# Patient Record
Sex: FEMALE | Race: WHITE | URBAN - METROPOLITAN AREA
[De-identification: names, ages, dates, MRNs, and addresses within clinical notes are randomized per-mention and may not be internally consistent; named-entity substitution may affect disease eponyms.]

---

## 2023-11-02 LAB
ABO, EXTERNAL RESULT: NORMAL
HEP B, EXTERNAL RESULT: NEGATIVE
HIV, EXTERNAL RESULT: NON REACTIVE
RH FACTOR, EXTERNAL RESULT: POSITIVE
RUBELLA TITER, EXTERNAL RESULT: NORMAL
T. PALLIDUM (SYPHILIS) ANTIBODY, EXTERNAL RESULT: NON REACTIVE

## 2023-11-16 LAB
C. TRACHOMATIS, EXTERNAL RESULT: NEGATIVE
N. GONORRHOEAE, EXTERNAL RESULT: NEGATIVE

## 2024-04-16 LAB — GBS, EXTERNAL RESULT: POSITIVE

## 2024-04-26 ENCOUNTER — HOSPITAL ENCOUNTER (OUTPATIENT)
Dept: INFUSION THERAPY | Age: 29
Discharge: HOME | End: 2024-04-26
Payer: COMMERCIAL

## 2024-04-26 ENCOUNTER — INITIAL CONSULT (OUTPATIENT)
Dept: ONCOLOGY | Age: 29
End: 2024-04-26
Payer: COMMERCIAL

## 2024-04-26 VITALS
RESPIRATION RATE: 16 BRPM | TEMPERATURE: 98 F | OXYGEN SATURATION: 98 % | HEART RATE: 68 BPM | SYSTOLIC BLOOD PRESSURE: 127 MMHG | DIASTOLIC BLOOD PRESSURE: 60 MMHG | WEIGHT: 165.8 LBS

## 2024-04-26 DIAGNOSIS — D69.6 THROMBOCYTOPENIA (HCC): ICD-10-CM

## 2024-04-26 DIAGNOSIS — D64.9 ANEMIA, UNSPECIFIED TYPE: ICD-10-CM

## 2024-04-26 DIAGNOSIS — D69.6 THROMBOCYTOPENIA (HCC): Primary | ICD-10-CM

## 2024-04-26 LAB
BASOPHILS ABSOLUTE: 0 K/CU MM
BASOPHILS RELATIVE PERCENT: 0.1 % (ref 0–1)
DIFFERENTIAL TYPE: ABNORMAL
EOSINOPHILS ABSOLUTE: 0 K/CU MM
EOSINOPHILS RELATIVE PERCENT: 0.4 % (ref 0–3)
HCT VFR BLD CALC: 34.7 % (ref 37–47)
HEMOGLOBIN: 11.4 GM/DL (ref 12.5–16)
LYMPHOCYTES ABSOLUTE: 1.9 K/CU MM
LYMPHOCYTES RELATIVE PERCENT: 21 % (ref 24–44)
MCH RBC QN AUTO: 27 PG (ref 27–31)
MCHC RBC AUTO-ENTMCNC: 32.9 % (ref 32–36)
MCV RBC AUTO: 82 FL (ref 78–100)
MONOCYTES ABSOLUTE: 0.8 K/CU MM
MONOCYTES RELATIVE PERCENT: 8.4 % (ref 0–4)
NEUTROPHILS RELATIVE PERCENT: 70.1 % (ref 36–66)
PDW BLD-RTO: 13.8 % (ref 11.7–14.9)
PLATELET # BLD: 104 K/CU MM (ref 140–440)
PMV BLD AUTO: 12.6 FL (ref 7.5–11.1)
RBC # BLD: 4.23 M/CU MM (ref 4.2–5.4)
SEGMENTED NEUTROPHILS ABSOLUTE COUNT: 6.3 K/CU MM
WBC # BLD: 9 K/CU MM (ref 4–10.5)

## 2024-04-26 PROCEDURE — 85025 COMPLETE CBC W/AUTO DIFF WBC: CPT

## 2024-04-26 PROCEDURE — 36415 COLL VENOUS BLD VENIPUNCTURE: CPT

## 2024-04-26 PROCEDURE — 99202 OFFICE O/P NEW SF 15 MIN: CPT

## 2024-04-26 PROCEDURE — 99204 OFFICE O/P NEW MOD 45 MIN: CPT | Performed by: INTERNAL MEDICINE

## 2024-04-26 RX ORDER — FERROUS SULFATE 325(65) MG
1 TABLET ORAL EVERY OTHER DAY
COMMUNITY
Start: 2024-03-24

## 2024-04-26 RX ORDER — ASPIRIN 81 MG/1
81 TABLET, COATED ORAL DAILY
COMMUNITY
Start: 2024-04-15 | End: 2024-04-26

## 2024-04-26 ASSESSMENT — PATIENT HEALTH QUESTIONNAIRE - PHQ9
SUM OF ALL RESPONSES TO PHQ9 QUESTIONS 1 & 2: 0
1. LITTLE INTEREST OR PLEASURE IN DOING THINGS: NOT AT ALL
SUM OF ALL RESPONSES TO PHQ QUESTIONS 1-9: 0
2. FEELING DOWN, DEPRESSED OR HOPELESS: NOT AT ALL
SUM OF ALL RESPONSES TO PHQ QUESTIONS 1-9: 0

## 2024-04-26 NOTE — PROGRESS NOTES
MA Rooming Questions  Patient: Maddie Vaughn  MRN: [id number]    Date: 4/26/2024        JOYA Plaza CMA      
symmetrical, no jugular venous distension and no carotid bruits   HEMATOLOGIC/LYMPHATIC: no cervical, supraclavicular or axillary lymphadenopathy   LUNGS: no increased work of breathing and clear to auscultation   CARDIOVASCULAR: regular rate and rhythm, normal S1 and S2, no murmur noted  ABDOMEN: normal bowel sounds x 4, soft,  non-tender, + pregnancy.  MUSCULOSKELETAL: full range of motion noted, tone is normal  NEUROLOGIC: awake, alert, oriented to name, place and time. Motor skills grossly intact.   SKIN: Normal skin color, texture, turgor and no jaundice. appears intact   EXTREMITIES: no LE edema      Labs:  Hematology:  No results found for: \"WBC\", \"RBC\", \"HGB\", \"HCT\", \"MCV\", \"MCH\", \"MCHC\", \"RDW\", \"PLT\", \"MPV\", \"BANDSPCT\", \"SEGSPCT\", \"EOSRELPCT\", \"BASOPCT\", \"LYMPHOPCT\", \"MONOPCT\", \"BANDABS\", \"SEGSABS\", \"EOSABS\", \"BASOSABS\", \"LYMPHSABS\", \"MONOSABS\", \"DIFFTYPE\", \"ANISOCYTOSIS\", \"POLYCHROM\", \"WBCMORP\", \"PLTM\"  No results found for: \"ESR\"    Chemistry:  No results found for: \"NA\", \"K\", \"CL\", \"CO2\", \"BUN\", \"CREATININE\", \"GLUCOSE\", \"CALCIUM\", \"PROT\", \"LABALBU\", \"BILITOT\", \"ALKPHOS\", \"AST\", \"ALT\", \"LABGLOM\", \"GFRAA\", \"AGRATIO\", \"GLOB\", \"PHOS\", \"MG\", \"POCCA\", \"POCGLU\"  No results found for: \"MMA\", \"LDH\", \"HOMOCYSTEINE\"  No results found for: \"LD\"  No results found for: \"TSHHS\", \"T4FREE\", \"FT3\"    Immunology:  No results found for: \"PROT\", \"SPEP\", \"ALBUMINELP\", \"LABALPH\", \"LABBETA\", \"GAMGLOB\"  No results found for: \"KAPPAUVOL\", \"LAMBDAUVOL\", \"KLFLCR\"  No results found for: \"B2M\"    Coagulation Panel:  No results found for: \"PROTIME\", \"INR\", \"APTT\", \"DDIMER\"    Anemia Panel:  No results found for: \"QGNXIIHW73\", \"FOLATE\"    Tumor Markers:  No results found for: \"\", \"CEA\", \"\", \"LABCA2\", \"PSA\"     Observations:  No data recorded     Assessment & Plan:    1. She has mild thrombocytopenia with elevated MPV.  DD: gestational thrombocytopenia vs ITP.  4/26/2024 WBC 9, Hg 11.4, MCV 92. Platelet 104. MPV elevated at

## 2024-05-09 ENCOUNTER — HOSPITAL ENCOUNTER (INPATIENT)
Age: 29
LOS: 2 days | Discharge: HOME | End: 2024-05-11
Attending: OBSTETRICS & GYNECOLOGY | Admitting: OBSTETRICS & GYNECOLOGY
Payer: COMMERCIAL

## 2024-05-09 DIAGNOSIS — G89.18 POST-OPERATIVE PAIN: Primary | ICD-10-CM

## 2024-05-09 PROBLEM — O26.90 PREGNANCY-RELATED COMPLAINT: Status: ACTIVE | Noted: 2024-05-09

## 2024-05-09 LAB
ABO/RH: NORMAL
ANTIBODY SCREEN: NEGATIVE
BACTERIA: ABNORMAL /HPF
BILIRUBIN, URINE: NEGATIVE MG/DL
BLOOD, URINE: NEGATIVE
CLARITY: CLEAR
COLOR: YELLOW
GLUCOSE URINE: NEGATIVE MG/DL
HCT VFR BLD CALC: 38.8 % (ref 37–47)
HEMOGLOBIN: 12.6 GM/DL (ref 12.5–16)
KETONES, URINE: NEGATIVE MG/DL
LEUKOCYTE ESTERASE, URINE: ABNORMAL
MCH RBC QN AUTO: 26.9 PG (ref 27–31)
MCHC RBC AUTO-ENTMCNC: 32.5 % (ref 32–36)
MCV RBC AUTO: 82.9 FL (ref 78–100)
MUCUS: ABNORMAL HPF
NITRITE URINE, QUANTITATIVE: NEGATIVE
PDW BLD-RTO: 13.2 % (ref 11.7–14.9)
PH, URINE: 6.5 (ref 5–8)
PLATELET # BLD: 115 K/CU MM (ref 140–440)
PMV BLD AUTO: 13.3 FL (ref 7.5–11.1)
PROTEIN UA: NEGATIVE MG/DL
RBC # BLD: 4.68 M/CU MM (ref 4.2–5.4)
RBC URINE: <1 /HPF (ref 0–6)
SPECIFIC GRAVITY UA: 1.01 (ref 1–1.03)
SQUAMOUS EPITHELIAL: 3 /HPF
TRICHOMONAS: ABNORMAL /HPF
UROBILINOGEN, URINE: 0.2 MG/DL (ref 0.2–1)
WBC # BLD: 13.8 K/CU MM (ref 4–10.5)
WBC UA: <1 /HPF (ref 0–5)

## 2024-05-09 PROCEDURE — 1220000000 HC SEMI PRIVATE OB R&B

## 2024-05-09 PROCEDURE — 86901 BLOOD TYPING SEROLOGIC RH(D): CPT

## 2024-05-09 PROCEDURE — 85027 COMPLETE CBC AUTOMATED: CPT

## 2024-05-09 PROCEDURE — APPNB45 APP NON BILLABLE 31-45 MINUTES

## 2024-05-09 PROCEDURE — 2500000003 HC RX 250 WO HCPCS

## 2024-05-09 PROCEDURE — 81001 URINALYSIS AUTO W/SCOPE: CPT

## 2024-05-09 PROCEDURE — 0KQM0ZZ REPAIR PERINEUM MUSCLE, OPEN APPROACH: ICD-10-PCS | Performed by: OBSTETRICS & GYNECOLOGY

## 2024-05-09 PROCEDURE — 2580000003 HC RX 258

## 2024-05-09 PROCEDURE — 86780 TREPONEMA PALLIDUM: CPT

## 2024-05-09 PROCEDURE — 86900 BLOOD TYPING SEROLOGIC ABO: CPT

## 2024-05-09 PROCEDURE — 6360000002 HC RX W HCPCS

## 2024-05-09 PROCEDURE — 86850 RBC ANTIBODY SCREEN: CPT

## 2024-05-09 PROCEDURE — 7200000001 HC VAGINAL DELIVERY

## 2024-05-09 RX ORDER — TERBUTALINE SULFATE 1 MG/ML
0.25 INJECTION, SOLUTION SUBCUTANEOUS
Status: ACTIVE | OUTPATIENT
Start: 2024-05-09 | End: 2024-05-10

## 2024-05-09 RX ORDER — ONDANSETRON 4 MG/1
4 TABLET, ORALLY DISINTEGRATING ORAL EVERY 6 HOURS PRN
Status: DISCONTINUED | OUTPATIENT
Start: 2024-05-09 | End: 2024-05-10 | Stop reason: ALTCHOICE

## 2024-05-09 RX ORDER — SODIUM CHLORIDE, SODIUM LACTATE, POTASSIUM CHLORIDE, CALCIUM CHLORIDE 600; 310; 30; 20 MG/100ML; MG/100ML; MG/100ML; MG/100ML
INJECTION, SOLUTION INTRAVENOUS CONTINUOUS
Status: DISCONTINUED | OUTPATIENT
Start: 2024-05-09 | End: 2024-05-11 | Stop reason: HOSPADM

## 2024-05-09 RX ORDER — LIDOCAINE HYDROCHLORIDE 10 MG/ML
30 INJECTION, SOLUTION EPIDURAL; INFILTRATION; INTRACAUDAL; PERINEURAL PRN
Status: COMPLETED | OUTPATIENT
Start: 2024-05-09 | End: 2024-05-09

## 2024-05-09 RX ORDER — ONDANSETRON 2 MG/ML
4 INJECTION INTRAMUSCULAR; INTRAVENOUS EVERY 6 HOURS PRN
Status: DISCONTINUED | OUTPATIENT
Start: 2024-05-09 | End: 2024-05-10 | Stop reason: ALTCHOICE

## 2024-05-09 RX ORDER — ACETAMINOPHEN 500 MG
1000 TABLET ORAL
Status: ACTIVE | OUTPATIENT
Start: 2024-05-09 | End: 2024-05-10

## 2024-05-09 RX ORDER — IBUPROFEN 800 MG/1
800 TABLET ORAL EVERY 8 HOURS SCHEDULED
Status: DISCONTINUED | OUTPATIENT
Start: 2024-05-10 | End: 2024-05-11 | Stop reason: HOSPADM

## 2024-05-09 RX ORDER — SODIUM CHLORIDE 9 MG/ML
25 INJECTION, SOLUTION INTRAVENOUS PRN
Status: DISCONTINUED | OUTPATIENT
Start: 2024-05-09 | End: 2024-05-11 | Stop reason: HOSPADM

## 2024-05-09 RX ORDER — CARBOPROST TROMETHAMINE 250 UG/ML
250 INJECTION, SOLUTION INTRAMUSCULAR PRN
Status: DISCONTINUED | OUTPATIENT
Start: 2024-05-09 | End: 2024-05-10 | Stop reason: ALTCHOICE

## 2024-05-09 RX ORDER — MISOPROSTOL 200 UG/1
400 TABLET ORAL PRN
Status: DISCONTINUED | OUTPATIENT
Start: 2024-05-09 | End: 2024-05-10 | Stop reason: ALTCHOICE

## 2024-05-09 RX ORDER — METHYLERGONOVINE MALEATE 0.2 MG/ML
200 INJECTION INTRAVENOUS PRN
Status: DISCONTINUED | OUTPATIENT
Start: 2024-05-09 | End: 2024-05-10 | Stop reason: SDUPTHER

## 2024-05-09 RX ORDER — SODIUM CHLORIDE, SODIUM LACTATE, POTASSIUM CHLORIDE, AND CALCIUM CHLORIDE .6; .31; .03; .02 G/100ML; G/100ML; G/100ML; G/100ML
1000 INJECTION, SOLUTION INTRAVENOUS PRN
Status: DISCONTINUED | OUTPATIENT
Start: 2024-05-09 | End: 2024-05-10 | Stop reason: ALTCHOICE

## 2024-05-09 RX ORDER — FENTANYL CITRATE 50 UG/ML
100 INJECTION, SOLUTION INTRAMUSCULAR; INTRAVENOUS
Status: DISCONTINUED | OUTPATIENT
Start: 2024-05-09 | End: 2024-05-10 | Stop reason: HOSPADM

## 2024-05-09 RX ORDER — SODIUM CHLORIDE, SODIUM LACTATE, POTASSIUM CHLORIDE, AND CALCIUM CHLORIDE .6; .31; .03; .02 G/100ML; G/100ML; G/100ML; G/100ML
500 INJECTION, SOLUTION INTRAVENOUS PRN
Status: DISCONTINUED | OUTPATIENT
Start: 2024-05-09 | End: 2024-05-10 | Stop reason: ALTCHOICE

## 2024-05-09 RX ORDER — SODIUM CHLORIDE 0.9 % (FLUSH) 0.9 %
5-40 SYRINGE (ML) INJECTION PRN
Status: DISCONTINUED | OUTPATIENT
Start: 2024-05-09 | End: 2024-05-11 | Stop reason: HOSPADM

## 2024-05-09 RX ORDER — SODIUM CHLORIDE 0.9 % (FLUSH) 0.9 %
5-40 SYRINGE (ML) INJECTION EVERY 12 HOURS SCHEDULED
Status: DISCONTINUED | OUTPATIENT
Start: 2024-05-09 | End: 2024-05-11 | Stop reason: HOSPADM

## 2024-05-09 RX ORDER — TRANEXAMIC ACID 10 MG/ML
1000 INJECTION, SOLUTION INTRAVENOUS
Status: DISCONTINUED | OUTPATIENT
Start: 2024-05-09 | End: 2024-05-10 | Stop reason: ALTCHOICE

## 2024-05-09 RX ORDER — FAMOTIDINE 10 MG/ML
20 INJECTION, SOLUTION INTRAVENOUS 2 TIMES DAILY PRN
Status: DISCONTINUED | OUTPATIENT
Start: 2024-05-09 | End: 2024-05-10 | Stop reason: HOSPADM

## 2024-05-09 RX ADMIN — SODIUM CHLORIDE, POTASSIUM CHLORIDE, SODIUM LACTATE AND CALCIUM CHLORIDE: 600; 310; 30; 20 INJECTION, SOLUTION INTRAVENOUS at 17:05

## 2024-05-09 RX ADMIN — SODIUM CHLORIDE, POTASSIUM CHLORIDE, SODIUM LACTATE AND CALCIUM CHLORIDE: 600; 310; 30; 20 INJECTION, SOLUTION INTRAVENOUS at 20:55

## 2024-05-09 RX ADMIN — ONDANSETRON 4 MG: 2 INJECTION INTRAMUSCULAR; INTRAVENOUS at 17:42

## 2024-05-09 RX ADMIN — AMPICILLIN 1000 MG: 1 INJECTION, POWDER, FOR SOLUTION INTRAMUSCULAR; INTRAVENOUS at 20:58

## 2024-05-09 RX ADMIN — AMPICILLIN SODIUM 2000 MG: 2 INJECTION, POWDER, FOR SOLUTION INTRAVENOUS at 17:07

## 2024-05-09 RX ADMIN — LIDOCAINE HYDROCHLORIDE 30 ML: 10 INJECTION, SOLUTION EPIDURAL; INFILTRATION; INTRACAUDAL; PERINEURAL at 21:41

## 2024-05-09 RX ADMIN — Medication 166.7 ML: at 21:40

## 2024-05-09 ASSESSMENT — PAIN DESCRIPTION - LOCATION
LOCATION: ABDOMEN

## 2024-05-09 ASSESSMENT — PAIN - FUNCTIONAL ASSESSMENT
PAIN_FUNCTIONAL_ASSESSMENT: ACTIVITIES ARE NOT PREVENTED

## 2024-05-09 ASSESSMENT — PAIN DESCRIPTION - ONSET
ONSET: ON-GOING

## 2024-05-09 ASSESSMENT — PAIN SCALES - GENERAL
PAINLEVEL_OUTOF10: 7
PAINLEVEL_OUTOF10: 7
PAINLEVEL_OUTOF10: 2

## 2024-05-09 ASSESSMENT — PAIN DESCRIPTION - FREQUENCY
FREQUENCY: INTERMITTENT

## 2024-05-09 ASSESSMENT — PAIN DESCRIPTION - ORIENTATION
ORIENTATION: LOWER;MID
ORIENTATION: LOWER;MID
ORIENTATION: MID;LOWER

## 2024-05-09 ASSESSMENT — PAIN DESCRIPTION - DESCRIPTORS
DESCRIPTORS: CRAMPING

## 2024-05-09 ASSESSMENT — PAIN DESCRIPTION - PAIN TYPE
TYPE: ACUTE PAIN

## 2024-05-09 NOTE — PROGRESS NOTES
Ambulatory to unit accompanied by significant other with C/O contractions that started around 6am and now they are 10 mins apart. Oriented to TR04, ua collected, clean gown applied, and placed on [facility]. Call light in pt reach.

## 2024-05-09 NOTE — PROGRESS NOTES
Department of Obstetrics and Gynecology   Obstetrics History and Physical        CHIEF COMPLAINT:   Chief Complaint   Patient presents with    Contractions         HISTORY OF PRESENT ILLNESS:      The patient is a 28 y.o. female at 39w5d.  OB History          1    Para        Term                AB        Living             SAB        IAB        Ectopic        Molar        Multiple        Live Births                Patient presents with a chief complaint as above and is being admitted for early latent labor    Estimated Due Date: Estimated Date of Delivery: 24    PRENATAL CARE:    Complicated by: Thrombocytopenia gestational, anemia     PAST OB HISTORY  OB History          1    Para        Term                AB        Living             SAB        IAB        Ectopic        Molar        Multiple        Live Births                    Past Medical History:    No past medical history on file.  Past Surgical History:    No past surgical history on file.  Allergies:  Patient has no known allergies.  Social History:    Social History     Socioeconomic History    Marital status:      Spouse name: Not on file    Number of children: Not on file    Years of education: Not on file    Highest education level: Not on file   Occupational History    Not on file   Tobacco Use    Smoking status: Never    Smokeless tobacco: Never   Substance and Sexual Activity    Alcohol use: Not on file    Drug use: Not on file    Sexual activity: Not on file   Other Topics Concern    Not on file   Social History Narrative    Not on file     Social Determinants of Health     Financial Resource Strain: Not on file   Food Insecurity: No Food Insecurity (2024)    Hunger Vital Sign     Worried About Running Out of Food in the Last Year: Never true     Ran Out of Food in the Last Year: Never true   Transportation Needs: No Transportation Needs (2024)    PRAPARE - Transportation     Lack of Transportation  minutes    Membranes:  Intact    ASSESSMENT AND PLAN:    Labor: Admit, anticipate normal delivery, routine labor orders  Fetus: Reassuring  GBS:positive  Other: IV hydration and antiemetics, IV antibiotic therapy, pitocin      I have collaborated and updated Dr. Mishra  and the MD agrees with the current POC.    KAMILA Evans CNM

## 2024-05-09 NOTE — FLOWSHEET NOTE
TR to Dr. Antonio notified of labor progress and pain level.  Orders received to admit for labor.  Notified that patient does not wan an epidural and would like to have intermittent fetal monitoring to allow for walking and movement.  Patient ambulating in [place].

## 2024-05-10 LAB — T. PALLIDUM SCREEN: NEGATIVE

## 2024-05-10 PROCEDURE — 6370000000 HC RX 637 (ALT 250 FOR IP): Performed by: OBSTETRICS & GYNECOLOGY

## 2024-05-10 PROCEDURE — 2580000003 HC RX 258

## 2024-05-10 PROCEDURE — 1220000000 HC SEMI PRIVATE OB R&B

## 2024-05-10 RX ORDER — MISOPROSTOL 200 UG/1
400 TABLET ORAL PRN
Status: DISCONTINUED | OUTPATIENT
Start: 2024-05-10 | End: 2024-05-11 | Stop reason: HOSPADM

## 2024-05-10 RX ORDER — SODIUM CHLORIDE 9 MG/ML
INJECTION, SOLUTION INTRAVENOUS PRN
Status: DISCONTINUED | OUTPATIENT
Start: 2024-05-10 | End: 2024-05-11 | Stop reason: HOSPADM

## 2024-05-10 RX ORDER — SODIUM CHLORIDE 0.9 % (FLUSH) 0.9 %
5-40 SYRINGE (ML) INJECTION EVERY 12 HOURS SCHEDULED
Status: DISCONTINUED | OUTPATIENT
Start: 2024-05-10 | End: 2024-05-11 | Stop reason: HOSPADM

## 2024-05-10 RX ORDER — DOCUSATE SODIUM 100 MG/1
100 CAPSULE, LIQUID FILLED ORAL 2 TIMES DAILY
Status: DISCONTINUED | OUTPATIENT
Start: 2024-05-10 | End: 2024-05-11 | Stop reason: HOSPADM

## 2024-05-10 RX ORDER — METHYLERGONOVINE MALEATE 0.2 MG/ML
200 INJECTION INTRAVENOUS PRN
Status: DISCONTINUED | OUTPATIENT
Start: 2024-05-10 | End: 2024-05-11 | Stop reason: HOSPADM

## 2024-05-10 RX ORDER — TRANEXAMIC ACID 10 MG/ML
1000 INJECTION, SOLUTION INTRAVENOUS
Status: ACTIVE | OUTPATIENT
Start: 2024-05-10 | End: 2024-05-11

## 2024-05-10 RX ORDER — ONDANSETRON 4 MG/1
8 TABLET, ORALLY DISINTEGRATING ORAL EVERY 8 HOURS PRN
Status: DISCONTINUED | OUTPATIENT
Start: 2024-05-10 | End: 2024-05-11 | Stop reason: HOSPADM

## 2024-05-10 RX ORDER — MISOPROSTOL 200 UG/1
800 TABLET ORAL PRN
Status: DISCONTINUED | OUTPATIENT
Start: 2024-05-10 | End: 2024-05-11 | Stop reason: HOSPADM

## 2024-05-10 RX ORDER — SODIUM CHLORIDE 0.9 % (FLUSH) 0.9 %
5-40 SYRINGE (ML) INJECTION PRN
Status: DISCONTINUED | OUTPATIENT
Start: 2024-05-10 | End: 2024-05-11 | Stop reason: HOSPADM

## 2024-05-10 RX ORDER — ACETAMINOPHEN 500 MG
1000 TABLET ORAL EVERY 8 HOURS SCHEDULED
Status: DISCONTINUED | OUTPATIENT
Start: 2024-05-10 | End: 2024-05-11 | Stop reason: HOSPADM

## 2024-05-10 RX ORDER — LANOLIN 72 %
OINTMENT (GRAM) TOPICAL PRN
Status: DISCONTINUED | OUTPATIENT
Start: 2024-05-10 | End: 2024-05-11 | Stop reason: HOSPADM

## 2024-05-10 RX ADMIN — Medication 10 ML: at 02:05

## 2024-05-10 RX ADMIN — IBUPROFEN 800 MG: 800 TABLET, FILM COATED ORAL at 00:10

## 2024-05-10 RX ADMIN — IBUPROFEN 800 MG: 800 TABLET, FILM COATED ORAL at 08:56

## 2024-05-10 RX ADMIN — DOCUSATE SODIUM 100 MG: 100 CAPSULE, LIQUID FILLED ORAL at 20:22

## 2024-05-10 RX ADMIN — ACETAMINOPHEN 1000 MG: 500 TABLET ORAL at 05:03

## 2024-05-10 RX ADMIN — ACETAMINOPHEN 1000 MG: 500 TABLET ORAL at 12:39

## 2024-05-10 RX ADMIN — IBUPROFEN 800 MG: 800 TABLET, FILM COATED ORAL at 20:22

## 2024-05-10 RX ADMIN — DOCUSATE SODIUM 100 MG: 100 CAPSULE, LIQUID FILLED ORAL at 08:56

## 2024-05-10 ASSESSMENT — PAIN DESCRIPTION - ORIENTATION
ORIENTATION: LOWER;MID
ORIENTATION: LOWER
ORIENTATION: LOWER
ORIENTATION: MID;LOWER

## 2024-05-10 ASSESSMENT — PAIN - FUNCTIONAL ASSESSMENT
PAIN_FUNCTIONAL_ASSESSMENT: ACTIVITIES ARE NOT PREVENTED

## 2024-05-10 ASSESSMENT — PAIN DESCRIPTION - LOCATION
LOCATION: ABDOMEN
LOCATION: PERINEUM
LOCATION: ABDOMEN
LOCATION: ABDOMEN

## 2024-05-10 ASSESSMENT — PAIN SCALES - GENERAL
PAINLEVEL_OUTOF10: 1
PAINLEVEL_OUTOF10: 2
PAINLEVEL_OUTOF10: 3
PAINLEVEL_OUTOF10: 3
PAINLEVEL_OUTOF10: 2

## 2024-05-10 ASSESSMENT — PAIN DESCRIPTION - DESCRIPTORS
DESCRIPTORS: CRAMPING
DESCRIPTORS: CRAMPING
DESCRIPTORS: ACHING
DESCRIPTORS: ACHING

## 2024-05-10 NOTE — LACTATION NOTE
Assisted mother with breast feeding per her request. Mother able to position infant on her own and needs minimal assistance with latching infant.     Discussed with mother about breast feeding and that some infants will have what is termed \"second night syndrome\" where the infant will want to eat frequently, including even every hour. Informed mother that wanting to cluster feed, where infant does eat every hour, does not mean that infant is not getting enough milk. Encouraged mother to hold infant skin to skin as much as possible. Encouraged mother to drink plenty of fluid and to rest between feedings.

## 2024-05-10 NOTE — LACTATION NOTE
Reminded mother to hold infant skin to skin as much as possible between feeding times. Also reminded mother about safe sleep and if she is tired, to put infant in the crib. Mother verbalizes understanding. Reminded mother to breast feed at least every 2-3 hours and to offer both breast with each feeding. Informed mother that the infant should do at least 10 minutes (or longer) at each breast and to not limit the time infant is at the breast. Reminded mother to watch for feeding cues such as sucking on fists and rooting to start feedings. Informed mother that if it has been 3 hours and infant has not awaken, she may need to wake infant by unwrapping infant, gently massaging baby, burping infant prior to latch on and/or change diaper. Reminded mother to make sure infant has a deep latch, not just the nipple and that she should feel a tug with feeding but that it should not be painful.     Reminded mother that infants lose weight prior to discharge and that we do not want them to lose more than 10%. Infant should then be back to birth weight by the time he is 10-14 days old. Infants then usually gain from 0.5 to 1 ounce per day the first month.     Reminded mother that when infant is at the breast he will suckle, usually 5-10 times and pause and then go back to suckling. Infant should stay latched on the breast during the feeding. Infants will have growth spurts and they will want to breast feed more often. Encouraged mother to continue to breast feed on demand and that it should be at least every 3 hours. Reminded mother that the more frequently and completely the breast are emptied, the better the milk supply. The more the infant demands from the breast, the more the supply. Informed her that if she skips a breast feeding time, then that will diminish the supply. If possible, do not offer a bottle till after infant is 4 weeks old. Informed mother that anytime you give infant a bottle, you should use your breast pump.  If you want to pump to start having a milk supply to store, the best time to pump is after the early morning feeding. Encouraged mother to call for any assistance needed.

## 2024-05-10 NOTE — PLAN OF CARE
Problem: Vaginal Birth or  Section  Goal: Fetal and maternal status remain reassuring during the birth process  Description:  Birth OB-Pregnancy care plan goal which identifies if the fetal and maternal status remain reassuring during the birth process  Outcome: Progressing     Problem: Infection - Adult  Goal: Absence of infection at discharge  Outcome: Progressing     Problem: Safety - Adult  Goal: Free from fall injury  Outcome: Progressing     Problem: Chronic Conditions and Co-morbidities  Goal: Patient's chronic conditions and co-morbidity symptoms are monitored and maintained or improved  Outcome: Progressing

## 2024-05-10 NOTE — PROGRESS NOTES
Subjective:     Postpartum Day 1: Vaginal delivery    The patient feels well. The patient denies emotional concerns. Pain is well controlled with current medications. The baby is well.     Objective:      Vitals:    05/10/24 0900   BP: 110/70   Pulse: 80   Resp: 16   Temp: 98.6 °F (37 °C)   SpO2: 100%       General:    alert, appears stated age, and cooperative       Lochia:  appropriate   Uterine Fundus:   firm       DVT Evaluation:  No evidence of DVT seen on physical exam.     Assessment:     1.  Post partum day 1 . Doing well.         Plan:     Continue current care.

## 2024-05-10 NOTE — LACTATION NOTE
Initiated breast feeding and breast feeding teaching. Mother states she would like help with breast feeding and gives permission for breast to be touched by IBCLC to assist with latch on and positioning of infant. Discuss with mother different position changes: side lying, cradle hold, and football hold. Discuss with mother that breast feeding babies should breast feed every 2-3 hours for 10 to 15 minutes on each side. Also discuss with mother to listen and watch for infant feeding cues and that the baby may want to breast feed more frequently. Discuss with mother that she has colostrum for the first few days until her milk comes in and that this is enough for the baby the first few days. Explained to mother that the stomach of the baby is small so it fills up quickly and then empties quickly and that is why the infant needs to breast feed frequently. Discuss with mother that she needs to hold the infant head securely during feedings and to hold her breast with her hand to help guide the breast in infant mouth, and that the infant needs to have a deep latch on, more than just the nipple. Explained to mother that this helps stimulate the milk ducts which are farther back on the breast to produce and release milk and also helps to decrease soreness. Explained to mother that if the baby latches on to just the nipple it will increase soreness for her. Discuss with mother that when the infant is latched on well, he will suckle and then take rest from suckling, but that he should stay latched on and that he should suckle more than pause.  Mother ask appropriate questions. Encouraged mother to call for any assistance with breast feeding or any other needs.      Lanolin ointment given to mother. Instructed mother that only small amount is needed if she uses. Informed mother to apply small amount to nipple. Informed mother that she does not need to wipe off lanolin because it is safe for the infant. Informed mother that if

## 2024-05-10 NOTE — L&D DELIVERY SUMMARY NOTE
Department of Obstetrics and Gynecology  Spontaneous Vaginal Delivery Note         Pre-operative Diagnosis:  Term pregnancy, Spontaneous labor, Single fetus, and Pregnancy complicated by: gestational thrombocytopenia    Post-operative Diagnosis:  Same + live male   Procedure:  Spontaneous vaginal delivery    Surgeon:  Killian Antonio MD    Information for the patient's :  John Vaughn [[id number]]        Anesthesia:  none    Estimated blood loss:  250    Specimen:  Placenta not sent to pathology     Cord blood sent Yes    Complications:  none    Condition:  infant stable to general nursery    Details of Procedure:   The patient is a 28 y.o. female at 39w5d   OB History          1    Para        Term                AB        Living             SAB        IAB        Ectopic        Molar        Multiple        Live Births                 who was admitted for active phase labor. She received the following interventions: none She was known to be GBS positive and did receive antibiotic prophylaxis. The patient progressed well,did not receive an epidural, became complete and started to push. After pushing for 1 hour the fetal head was at the perineum, nose and mouth suctioned with bulb suction and the rest of the infant delivered atraumatically, placed on mother abdomen.Cord was clamped and cut and infant handed off to the waiting nurse for evaluation. The delivery of the placenta was spontaneous. The perineum and vagina were explored and a second degree laceration was repaired in standard fashion.

## 2024-05-10 NOTE — PLAN OF CARE
Progressing  5/10/2024 0208 by Claire Balderrama RN  Outcome: Progressing  Goal: Absence of fever/infection during anticipated neutropenic period  5/10/2024 0948 by Taisha Cruz RN  Outcome: Progressing  5/10/2024 0208 by Claire Balderrama RN  Outcome: Progressing     Problem: Safety - Adult  Goal: Free from fall injury  5/10/2024 0948 by Taisha Cruz RN  Outcome: Progressing  5/10/2024 0208 by Claire Balderrama RN  Outcome: Progressing  5/9/2024 2301 by Karl Vincent RN  Outcome: Progressing     Problem: Discharge Planning  Goal: Discharge to home or other facility with appropriate resources  5/10/2024 0948 by Taisha Cruz RN  Outcome: Progressing  5/10/2024 0208 by Claire Balderrama RN  Outcome: Progressing     Problem: Chronic Conditions and Co-morbidities  Goal: Patient's chronic conditions and co-morbidity symptoms are monitored and maintained or improved  5/10/2024 0948 by Taisha Cruz RN  Outcome: Progressing  5/10/2024 0208 by Claire Balderrama RN  Outcome: Progressing  5/9/2024 2301 by Karl Vincent RN  Outcome: Progressing     Problem: Alteration in the Breast  Goal: Optimize infant feeding at the breast  Description: INTERVENTIONS:  1. Breast and nipple assessment  2. Assess prior breast feeding history  3. Hand expression of breast milk  4. Mechanical pumping  5. Nipple Shield  6. Supplemental formula feeding (LIP order)  7. Supplemental feeding system/device  8. For cracked, bleeding and or sore nipples reassess latch, treat damaged nipple  Outcome: Progressing     Problem: Inadequate Latch, Suck, or Swallow  Goal: Demonstrate ability to latch and sustain latch, audible swallowing and satiety  Description: INTERVENTIONS:  1.  Assess oral anatomy, notify LIP for abnormal findings  2.  Hand expression  3.  Maximize feeding opportunity (skin to skin, behavioral state)  4.  Positioning techniques  5.  Discourage use of pacifier-artificial nipple  6.  Educate mother on feeding cues  Outcome:  Progressing

## 2024-05-10 NOTE — PROGRESS NOTES
Pt up to bathroom via ambulation, educated on use of peribottle and tucks pads,expressed understanding moderate amount of urine voided, transferred to [facility] via wheelchair with no s/s distress noted, report given to oncoming ROSELYN Long, pt updated on POC, expressed understanding, baby in [place] at bedside, no further needs voiced, call light in reach

## 2024-05-10 NOTE — PLAN OF CARE
Problem: Pain  Goal: Verbalizes/displays adequate comfort level or baseline comfort level  5/10/2024 0208 by Claire Balderrama RN  Outcome: Progressing  2024 by Karl Vincent RN  Outcome: Progressing     Problem: Postpartum  Goal: Experiences normal postpartum course  Description:  Postpartum OB-Pregnancy care plan goal which identifies if the mother is experiencing a normal postpartum course  Outcome: Progressing  Goal: Appropriate maternal -  bonding  Description:  Postpartum OB-Pregnancy care plan goal which identifies if the mother and  are bonding appropriately  Outcome: Progressing  Goal: Establishment of infant feeding pattern  Description:  Postpartum OB-Pregnancy care plan goal which identifies if the mother is establishing a feeding pattern with their   Outcome: Progressing  Goal: Incisions, wounds, or drain sites healing without S/S of infection  Outcome: Progressing     Problem: Infection - Adult  Goal: Absence of infection at discharge  5/10/2024 0208 by Claire Balderrama RN  Outcome: Progressing  2024 by Karl Vincent RN  Outcome: Progressing  Goal: Absence of infection during hospitalization  Outcome: Progressing  Goal: Absence of fever/infection during anticipated neutropenic period  Outcome: Progressing     Problem: Safety - Adult  Goal: Free from fall injury  5/10/2024 0208 by Claire Balderrama RN  Outcome: Progressing  2024 by Karl Vincent RN  Outcome: Progressing     Problem: Discharge Planning  Goal: Discharge to home or other facility with appropriate resources  Outcome: Progressing     Problem: Chronic Conditions and Co-morbidities  Goal: Patient's chronic conditions and co-morbidity symptoms are monitored and maintained or improved  5/10/2024 0208 by Claire Balderrama RN  Outcome: Progressing  2024 by Karl Vincent RN  Outcome: Progressing

## 2024-05-10 NOTE — PROGRESS NOTES
Reminded mother about infant safe sleep campaign per the [employer]. Instructed mother that the infant should be Alone in crib, no pillows or stuffed animals in the crib and only the blanket the baby needs in the crib with baby. Also, that the baby should be on the Back to sleep. That the baby should be asleep in the Crib, this should be a firm and flat (not inclined) surface. Informed mother that she should not fall asleep with the baby in bed (or couch or chair or anywhere) with her. Mother verbalizes understanding.

## 2024-05-10 NOTE — H&P
Department of Obstetrics and Gynecology   Obstetrics History and Physical           CHIEF COMPLAINT:       Chief Complaint   Patient presents with    Contractions            HISTORY OF PRESENT ILLNESS:       The patient is a 28 y.o. female at 39w5d.  OB History            1    Para        Term                AB        Living               SAB        IAB        Ectopic        Molar        Multiple        Live Births                 Patient presents with a chief complaint as above and is being admitted for early latent labor     Estimated Due Date: Estimated Date of Delivery: 24     PRENATAL CARE:     Complicated by: Thrombocytopenia gestational, anemia      PAST OB HISTORY  OB History            1    Para        Term                AB        Living               SAB        IAB        Ectopic        Molar        Multiple        Live Births                       Past Medical History:    Past Medical History   No past medical history on file.     Past Surgical History:    Past Surgical History   No past surgical history on file.     Allergies:  Patient has no known allergies.  Social History:    Social History               Socioeconomic History    Marital status:        Spouse name: Not on file    Number of children: Not on file    Years of education: Not on file    Highest education level: Not on file   Occupational History    Not on file   Tobacco Use    Smoking status: Never    Smokeless tobacco: Never   Substance and Sexual Activity    Alcohol use: Not on file    Drug use: Not on file    Sexual activity: Not on file   Other Topics Concern    Not on file   Social History Narrative    Not on file      Social Determinants of Health           Financial Resource Strain: Not on file   Food Insecurity: No Food Insecurity (2024)     Hunger Vital Sign      Worried About Running Out of Food in the Last Year: Never true      Ran Out of Food in the Last Year: Never true  Baseline Heart Rate:  130        Accelerations:  present       Variability:  moderate       Decelerations:  absent       Pelvis:  Adequate pelvis  Cervix: 4 cm 90 soft -2        Contraction frequency:  3 minutes     Membranes:  Intact     ASSESSMENT AND PLAN:     Labor: Admit, anticipate normal delivery, routine labor orders  Fetus: Reassuring  GBS:positive  Other: IV hydration and antiemetics, IV antibiotic therapy, pitocin        I have collaborated and updated Dr. Mishra  and the MD agrees with the current POC.     Vivian Finch APRN - TERRI Antonio MD

## 2024-05-11 VITALS
HEIGHT: 63 IN | SYSTOLIC BLOOD PRESSURE: 125 MMHG | WEIGHT: 168 LBS | HEART RATE: 63 BPM | RESPIRATION RATE: 18 BRPM | BODY MASS INDEX: 29.77 KG/M2 | TEMPERATURE: 98.5 F | OXYGEN SATURATION: 98 % | DIASTOLIC BLOOD PRESSURE: 63 MMHG

## 2024-05-11 LAB
HCT VFR BLD CALC: 28.3 % (ref 37–47)
HEMOGLOBIN: 9 GM/DL (ref 12.5–16)
MCH RBC QN AUTO: 27.2 PG (ref 27–31)
MCHC RBC AUTO-ENTMCNC: 31.8 % (ref 32–36)
MCV RBC AUTO: 85.5 FL (ref 78–100)
PDW BLD-RTO: 13.4 % (ref 11.7–14.9)
PLATELET # BLD: 91 K/CU MM (ref 140–440)
PMV BLD AUTO: 12.4 FL (ref 7.5–11.1)
RBC # BLD: 3.31 M/CU MM (ref 4.2–5.4)
WBC # BLD: 11.6 K/CU MM (ref 4–10.5)

## 2024-05-11 PROCEDURE — 85027 COMPLETE CBC AUTOMATED: CPT

## 2024-05-11 PROCEDURE — 94761 N-INVAS EAR/PLS OXIMETRY MLT: CPT

## 2024-05-11 PROCEDURE — 6370000000 HC RX 637 (ALT 250 FOR IP): Performed by: OBSTETRICS & GYNECOLOGY

## 2024-05-11 RX ORDER — HYDROCODONE BITARTRATE AND ACETAMINOPHEN 5; 325 MG/1; MG/1
1 TABLET ORAL EVERY 6 HOURS PRN
Qty: 8 TABLET | Refills: 0 | Status: SHIPPED | OUTPATIENT
Start: 2024-05-11 | End: 2024-05-16

## 2024-05-11 RX ORDER — IBUPROFEN 800 MG/1
800 TABLET ORAL EVERY 8 HOURS PRN
Qty: 60 TABLET | Refills: 2 | Status: SHIPPED | OUTPATIENT
Start: 2024-05-11

## 2024-05-11 RX ADMIN — ACETAMINOPHEN 1000 MG: 500 TABLET ORAL at 07:59

## 2024-05-11 RX ADMIN — IBUPROFEN 800 MG: 800 TABLET, FILM COATED ORAL at 04:14

## 2024-05-11 RX ADMIN — DOCUSATE SODIUM 100 MG: 100 CAPSULE, LIQUID FILLED ORAL at 08:00

## 2024-05-11 RX ADMIN — ACETAMINOPHEN 1000 MG: 500 TABLET ORAL at 00:14

## 2024-05-11 RX ADMIN — BENZOCAINE AND MENTHOL: 20; .5 SPRAY TOPICAL at 12:50

## 2024-05-11 ASSESSMENT — PAIN SCALES - GENERAL
PAINLEVEL_OUTOF10: 0
PAINLEVEL_OUTOF10: 2

## 2024-05-11 ASSESSMENT — PAIN DESCRIPTION - DESCRIPTORS
DESCRIPTORS: ACHING

## 2024-05-11 ASSESSMENT — PAIN - FUNCTIONAL ASSESSMENT: PAIN_FUNCTIONAL_ASSESSMENT: ACTIVITIES ARE NOT PREVENTED

## 2024-05-11 ASSESSMENT — PAIN DESCRIPTION - PAIN TYPE: TYPE: ACUTE PAIN

## 2024-05-11 ASSESSMENT — PAIN DESCRIPTION - FREQUENCY: FREQUENCY: INTERMITTENT

## 2024-05-11 ASSESSMENT — PAIN DESCRIPTION - ONSET: ONSET: GRADUAL

## 2024-05-11 ASSESSMENT — PAIN DESCRIPTION - ORIENTATION
ORIENTATION: LOWER
ORIENTATION: LOWER

## 2024-05-11 ASSESSMENT — PAIN DESCRIPTION - LOCATION
LOCATION: PERINEUM

## 2024-05-11 NOTE — DISCHARGE INSTRUCTIONS
Care Plan Once You Return Home        This section includes instructions you will need to follow once you leave the hospital. Your care team will discuss this with you, so that you and those caring for you know how to best care for your health needs at home. This section may also include educational information about certain health topics that may be of help to you.       Thank you for letting us care for you and your family.  The following are discharge instructions for yourself. If you have any questions once you  Have arrived home please feel free to contact the birthing center at [phone].        PAMPHLETS GIVEN TO PARENTS    [place] 2011 Good Nutrition for Women, Infants and Children.  March  Dim: Sounds or Pertussis to help protect the health and wellness of adults and infants.  [facility]: [place]'s  Screening Program.  [facility]: [place]  Hearing Screening.  [facility]'s: [place], [place] regional Postpartum Depression Support Network.  [facility]: All kids need Hepatitis B shots!      Back to Sleep handout.  Shaken Baby handout.               MOM INSTRUCTIONS    DIET    Eat a well balanced diet focusing on foods high in fiber and protein.  Drink plenty of fluids (8 to 10 glasses a day) especially water.  To avoid constipation you may take a mild stool softener as recommended by your doctor.              ACTIVITY    Gradually increase your activity. Resume your exercise regimen only after advised by you doctor.  Avoid lifting anything heavier than your baby for 6 weeks or until otherwise advised by your doctor.  Avoid driving for 2 weeks or if taking narcotics.  Climb stairs one at a time. Use caution when carrying your baby up and down the stairs.  NO SEXUAL ACTIVITY and nothing in your vagina(tampons or douching) for 6 weeks or until advised by your doctor.  Be prepared to discuss family planning at your follow-up OB  visit.  You may feel tired or have a lack of energy. Nap when the baby naps to catch up on your sleep.You may continue to take prenatal vitamin to replenish nutrients post delivery as directed by your doctor.      EMOTIONS    You may feel sad, teary, overwhelmed and logan. Contact your OB provider if symptoms worsen or last more than 2 weeks. Contact your OB provider if you have thoughts of harming yourself or your baby.  If your baby will not stop crying and you are feeling overwhelmed, place your baby in a crib on their back and contact another adult for help. NEVER SHAKE A BABY.       BLEEDING    Your vaginal bleeding will decrease in amount over the next 2 to 6 weeks.  You will notice that as your activity increases your flow may increase. This is your body's way of telling you to take it easy and rest more.  If you saturate more than one maxi pad in an hour or you pass a clot lemon size or larger and resting does not help to the slow down the flow, call your OB doctor.   If your bleeding has a foul odor call you doctor.      BREAST CARE    For breastfeeding moms:  Refer to your breastfeeding booklet provided by the hospital if you have any questions.  If you become engorged,feeding may be more difficult or painful for 1 to 2 days. You may find it helpful to express some breast milk before the feeding so that the infant can latch on to the breast more easy.  Continue to take your prenatal vitamins as directed by your doctor while you are breastfeeding.  For any questions or concerns, contact our Lactation Consultant at [phone].          For non-breastfeeding moms:  You may apply ice packs to your breasts over your bra for twenty minutes at a time for comfort.   Avoid stimulation to your breasts. When showering allow the water to strike your back not your breasts. Do not express your milk.  Wear a good fitting bra.    INCISIONAL CARE    Clean your incision in the shower with mild soap. After your shower pat the

## 2024-05-11 NOTE — FLOWSHEET NOTE
MOM HAS DECIDED TO PUT INFANT BACK ONTO BREAST. NOTIFIED PARENTS THAT HUGS BEEN REMOVED AND NOT TO LEAVE INFANT ALONE FOR SECURITY REASONS.

## 2024-05-11 NOTE — DISCHARGE SUMMARY
Obstetrical Discharge Form    Gestational Age:  39w5d    Antepartum complications: none    Date of Delivery:    24    Type of Delivery:   vaginal, spontaneous    Delivered By:                 Baby:       Information for the patient's :  John Vaughn Maddie [[id number]]      Anesthesia:    None per delivery note    Intrapartum complications: None    Postpartum complications: none    Vitals:    24 0414   BP: (!) 117/58   Pulse: 77   Resp: 17   Temp: 98 °F (36.7 °C)   SpO2: 98%     Lab Results   Component Value Date    WBC 11.6 (H) 2024    HGB 9.0 (L) 2024    HCT 28.3 (L) 2024    MCV 85.5 2024    PLT 91 (L) 2024       Condition at discharge: Good/stable  Continue prenatal vitamin and iron supplement    Discharge Date:  24     Plan:   Follow up in 6 weeks.

## 2024-05-11 NOTE — PLAN OF CARE
nipple  6.  Educate mother on feeding cues  5/10/2024 2038 by Maria M Cade, RN  Outcome: Progressing  5/10/2024 0948 by Taisha Cruz, RN  Outcome: Progressing

## 2024-05-11 NOTE — PROGRESS NOTES
Both parents present during first bath, taught and demonstrated how to bath the baby. Also, explained to sponge bath the baby until the umbilical cord falls off. Both parents verbalized understanding.

## 2024-05-11 NOTE — PLAN OF CARE
Problem: Pain  Goal: Verbalizes/displays adequate comfort level or baseline comfort level  2024 by Diana Marcano RN  Outcome: Completed  Flowsheets (Taken 2024 by Maria M Cade RN)  Verbalizes/displays adequate comfort level or baseline comfort level: Encourage patient to monitor pain and request assistance  5/10/2024 2038 by Maria M Cade RN  Outcome: Progressing  Flowsheets (Taken 5/10/2024 2003)  Verbalizes/displays adequate comfort level or baseline comfort level: Encourage patient to monitor pain and request assistance     Problem: Postpartum  Goal: Experiences normal postpartum course  Description:  Postpartum OB-Pregnancy care plan goal which identifies if the mother is experiencing a normal postpartum course  2024 by Diana Marcano RN  Outcome: Completed  5/10/2024 2038 by Maria M Cade RN  Outcome: Progressing  Goal: Appropriate maternal -  bonding  Description:  Postpartum OB-Pregnancy care plan goal which identifies if the mother and  are bonding appropriately  2024 by Diana Marcano RN  Outcome: Completed  5/10/2024 2038 by Maria M Cade RN  Outcome: Progressing  Goal: Establishment of infant feeding pattern  Description:  Postpartum OB-Pregnancy care plan goal which identifies if the mother is establishing a feeding pattern with their   2024 by Diana Marcano RN  Outcome: Completed  5/10/2024 2038 by Maria M Cade RN  Outcome: Progressing  Goal: Incisions, wounds, or drain sites healing without S/S of infection  2024 by Diana Mracano RN  Outcome: Completed  5/10/2024 2038 by Maria M Cade RN  Outcome: Progressing     Problem: Infection - Adult  Goal: Absence of infection at discharge  2024 by Diana Marcano RN  Outcome: Completed  5/10/2024 2038 by Maria M Cade RN  Outcome: Progressing  Goal: Absence of infection during hospitalization  2024 by Diana Marcano RN  Outcome:

## 2024-05-11 NOTE — FLOWSHEET NOTE
FINAL DISCHARGE INSTRUCTIONS FOR BOTH MOM AND INFANT REVIEWED WITH BOTH PARENTS, APPROPRIATE QUESTIONS ASKED AND VOICED UNDERSTANDING OF NEED TO MAKE F/U APPOINTMENTS FOR BOTH MOM AND INFANT. PLACENTA FROM DELIVERY GIVEN TO PATIENT TO TAKE HOME AS PER HER REQUEST, FROM HAD BEEN PREVIOUSLY SIGNED BY HER. ID BRACELET X 1 AND HUGS TAG REMOVED, MOM VERIFIES INFO AND FORM SIGNED. PARENTS ARE DRESSING INFANT AND WILL SECURE INFANT INTO CAR SEAT.

## 2024-05-11 NOTE — PLAN OF CARE
Problem: Pain  Goal: Verbalizes/displays adequate comfort level or baseline comfort level  Recent Flowsheet Documentation  Taken 2024 by Maria M Cade RN  Verbalizes/displays adequate comfort level or baseline comfort level: Encourage patient to monitor pain and request assistance  5/10/2024 2038 by Maria M Cade RN  Outcome: Progressing  Flowsheets (Taken 5/10/2024 2003)  Verbalizes/displays adequate comfort level or baseline comfort level: Encourage patient to monitor pain and request assistance     Problem: Postpartum  Goal: Experiences normal postpartum course  Description:  Postpartum OB-Pregnancy care plan goal which identifies if the mother is experiencing a normal postpartum course  5/10/2024 2038 by Maria M Cade RN  Outcome: Progressing  Goal: Appropriate maternal -  bonding  Description:  Postpartum OB-Pregnancy care plan goal which identifies if the mother and  are bonding appropriately  5/10/2024 2038 by Maria M Cade RN  Outcome: Progressing  Goal: Establishment of infant feeding pattern  Description:  Postpartum OB-Pregnancy care plan goal which identifies if the mother is establishing a feeding pattern with their   5/10/2024 2038 by Maria M Cade RN  Outcome: Progressing  Goal: Incisions, wounds, or drain sites healing without S/S of infection  5/10/2024 2038 by Maria M Cade RN  Outcome: Progressing     Problem: Infection - Adult  Goal: Absence of infection at discharge  5/10/2024 2038 by Maria M Cade RN  Outcome: Progressing  Goal: Absence of infection during hospitalization  5/10/2024 2038 by Maria M Cade RN  Outcome: Progressing  Goal: Absence of fever/infection during anticipated neutropenic period  5/10/2024 2038 by Maria M Cade RN  Outcome: Progressing     Problem: Safety - Adult  Goal: Free from fall injury  5/10/2024 2038 by Maria M Cade RN  Outcome: Progressing     Problem: Discharge Planning  Goal: Discharge to home or other facility

## 2024-05-11 NOTE — FLOWSHEET NOTE
PARENTS REPORT THEY ARE READY FOR DISCHARGE. HAVE SECURED INFANT INTO CAR SEAT. [facility] TRANSPORTS INFANT IN SEAT AND MOM AMBULATES (PER CHOICE) WHILE BEING ESCORTED FROM UNIT TO FRONT HOSPITAL'S EXIT. PARENTS SECURE INFANT INTO CAR AND M/B DYAD ARE DISCHARGED TO HOME VIA PRIVATE AUTO.